# Patient Record
Sex: MALE | URBAN - NONMETROPOLITAN AREA
[De-identification: names, ages, dates, MRNs, and addresses within clinical notes are randomized per-mention and may not be internally consistent; named-entity substitution may affect disease eponyms.]

---

## 2020-09-23 ENCOUNTER — APPOINTMENT (RX ONLY)
Dept: URBAN - NONMETROPOLITAN AREA CLINIC 30 | Facility: CLINIC | Age: 35
Setting detail: DERMATOLOGY
End: 2020-09-23

## 2020-09-23 VITALS — WEIGHT: 190 LBS

## 2020-09-23 DIAGNOSIS — N47.1 PHIMOSIS: ICD-10-CM

## 2020-09-23 PROCEDURE — ? ADDITIONAL NOTES

## 2020-09-23 NOTE — PROCEDURE: ADDITIONAL NOTES
Additional Notes: I suspect he is having trouble with his foreskin when he has an erection which may be like having a phimosis.  He says when he gets and erection it stretches his skin and he will have “cuts”.  I recommended he see a urologist, his skin appeared normal in this area\\n\\n\\nNo E&M code
Detail Level: Simple